# Patient Record
Sex: FEMALE | Race: WHITE | NOT HISPANIC OR LATINO | Employment: OTHER | URBAN - METROPOLITAN AREA
[De-identification: names, ages, dates, MRNs, and addresses within clinical notes are randomized per-mention and may not be internally consistent; named-entity substitution may affect disease eponyms.]

---

## 2023-09-29 ENCOUNTER — OFFICE VISIT (OUTPATIENT)
Dept: URGENT CARE | Facility: CLINIC | Age: 48
End: 2023-09-29
Payer: COMMERCIAL

## 2023-09-29 ENCOUNTER — APPOINTMENT (OUTPATIENT)
Dept: RADIOLOGY | Facility: CLINIC | Age: 48
End: 2023-09-29
Payer: COMMERCIAL

## 2023-09-29 VITALS
SYSTOLIC BLOOD PRESSURE: 119 MMHG | WEIGHT: 179.6 LBS | OXYGEN SATURATION: 95 % | DIASTOLIC BLOOD PRESSURE: 77 MMHG | TEMPERATURE: 99 F | HEART RATE: 92 BPM | RESPIRATION RATE: 22 BRPM

## 2023-09-29 DIAGNOSIS — J06.9 ACUTE URI: Primary | ICD-10-CM

## 2023-09-29 DIAGNOSIS — R05.1 ACUTE COUGH: ICD-10-CM

## 2023-09-29 PROCEDURE — 99214 OFFICE O/P EST MOD 30 MIN: CPT | Performed by: PHYSICIAN ASSISTANT

## 2023-09-29 RX ORDER — AZITHROMYCIN 250 MG/1
TABLET, FILM COATED ORAL
Qty: 6 TABLET | Refills: 0 | Status: SHIPPED | OUTPATIENT
Start: 2023-09-29 | End: 2023-10-03

## 2023-09-29 NOTE — PROGRESS NOTES
[facility]        NAME: Kristin Garcia is a 53 y.o. female  : 1975    MRN: [id number]  DATE: 2023  TIME: 7:19 PM      Assessment and Plan     Acute URI [J06.9]  1. Acute URI  azithromycin (ZITHROMAX) 250 mg tablet    CANCELED: XR chest pa & lateral        Note:   Ordered a CXR, but patient does not have insurance so she refused. Due to the duration of symptoms, will put her on antibiotics anyway that would cover pneumonia. Patient Instructions   There are no Patient Instructions on file for this visit. Follow up with PCP in 3-5 days. Go to ER if symptoms worsen. Chief Complaint     Chief Complaint   Patient presents with   • Cold Like Symptoms     Patient reported that she's been having cold-like symptoms for about week. Patient reported that for two days now she's been having a cough and feeling like she was short of breath. Patient reported she COVID tested herself at home and it was negative. Patient reported she's been using nightquil for symptoms an a cough syrup. History of Present Illness     Patient presents with cough x 2 days. She states she had cold symptoms x 1 week and the cough got worse within the last 2 days. She tried cough syrup and NyQuil without relief. Having dyspnea on exertion and is worried about pneumonia because she had it last year. Review of Systems     Review of Systems   Constitutional: Positive for fatigue. Negative for chills and fever. HENT: Negative for congestion, ear pain, postnasal drip, rhinorrhea, sinus pressure, sinus pain and sore throat. Eyes: Negative for pain and visual disturbance. Respiratory: Positive for cough, chest tightness and shortness of breath. Cardiovascular: Negative for chest pain and palpitations. Gastrointestinal: Negative for abdominal pain, diarrhea, nausea and vomiting. Genitourinary: Negative for dysuria and hematuria.    Musculoskeletal: Negative for arthralgias, back pain and myalgias. Skin: Negative for rash. Neurological: Negative for dizziness, seizures, syncope, numbness and headaches. All other systems reviewed and are negative. Current Medications       Current Outpatient Medications:   •  azithromycin (ZITHROMAX) 250 mg tablet, Take 2 tablets today then 1 tablet daily x 4 days, Disp: 6 tablet, Rfl: 0    Current Allergies     Allergies as of 09/29/2023   • (No Known Allergies)              The following portions of the patient's history were reviewed and updated as appropriate: allergies, current medications, past family history, past medical history, past social history, past surgical history, and problem list.     History reviewed. No pertinent past medical history. History reviewed. No pertinent surgical history. History reviewed. No pertinent family history. Medications have been verified. Objective     /77   Pulse 92   Temp 99 °F (37.2 °C) (Tympanic)   Resp 22   Wt 81.5 kg (179 lb 9.6 oz)   LMP 09/29/2023   SpO2 95%   Patient's last menstrual period was 09/29/2023. Physical Exam     Physical Exam  Vitals and nursing note reviewed. Constitutional:       Appearance: Normal appearance. She is normal weight. She is not ill-appearing. HENT:      Head: Normocephalic and atraumatic. Right Ear: Tympanic membrane, ear canal and external ear normal.      Left Ear: Tympanic membrane, ear canal and external ear normal.      Nose: Nose normal.      Mouth/Throat:      Mouth: Mucous membranes are moist.      Pharynx: Oropharynx is clear. Cardiovascular:      Rate and Rhythm: Normal rate and regular rhythm. Heart sounds: Normal heart sounds. Pulmonary:      Effort: Pulmonary effort is normal.      Breath sounds: Normal breath sounds. Skin:     General: Skin is warm and dry. Neurological:      General: No focal deficit present. Mental Status: She is alert and oriented to person, place, and time.    Psychiatric: Mood and Affect: Mood normal.         Behavior: Behavior normal.

## 2024-12-04 ENCOUNTER — OFFICE VISIT (OUTPATIENT)
Dept: URGENT CARE | Facility: CLINIC | Age: 49
End: 2024-12-04
Payer: COMMERCIAL

## 2024-12-04 VITALS
TEMPERATURE: 97 F | SYSTOLIC BLOOD PRESSURE: 104 MMHG | RESPIRATION RATE: 18 BRPM | WEIGHT: 163 LBS | BODY MASS INDEX: 28.88 KG/M2 | HEIGHT: 63 IN | OXYGEN SATURATION: 97 % | HEART RATE: 84 BPM | DIASTOLIC BLOOD PRESSURE: 68 MMHG

## 2024-12-04 DIAGNOSIS — L50.9 URTICARIA: Primary | ICD-10-CM

## 2024-12-04 PROCEDURE — 99213 OFFICE O/P EST LOW 20 MIN: CPT | Performed by: PHYSICIAN ASSISTANT

## 2024-12-04 RX ORDER — PREDNISONE 20 MG/1
TABLET ORAL
Qty: 24 TABLET | Refills: 0 | Status: SHIPPED | OUTPATIENT
Start: 2024-12-04 | End: 2024-12-16

## 2024-12-04 NOTE — PATIENT INSTRUCTIONS
Recommended oral steroid.  Advised to continue with oral and topical Benadryl as needed.  Advised to no longer use a new soap.        Follow up with PCP in 3-5 days.  Proceed to  ER if symptoms worsen.    If tests are performed, our office will contact you with results only if changes need to made to the care plan discussed with you at the visit. You can review your full results on [facility].

## 2024-12-04 NOTE — PROGRESS NOTES
[facility]        NAME: Bushra Mcgill is a 49 y.o. female  : 1975    MRN: [id number]  DATE: 2024  TIME: 4:21 PM    Assessment and Plan   Urticaria [L50.9]  1. Urticaria  predniSONE 20 mg tablet            Patient Instructions     Patient Instructions   Recommended oral steroid.  Advised to continue with oral and topical Benadryl as needed.  Advised to no longer use a new soap.        Follow up with PCP in 3-5 days.  Proceed to  ER if symptoms worsen.    If tests are performed, our office will contact you with results only if changes need to made to the care plan discussed with you at the visit. You can review your full results on [facility].        Chief Complaint     Chief Complaint   Patient presents with    Rash     Pt states she woke up with itchy rash allover body yesterday. Pt using benadryl cream and orally. Pt unsure what it is from but did use new soap last week with peppermint oil but was fine until yesterday.          History of Present Illness       Rash  This is a new problem. The current episode started yesterday. The problem has been gradually worsening since onset. The rash is diffuse. The problem is moderate. The rash is characterized by swelling, redness and itchiness. She was exposed to a new detergent/soap. Associated symptoms include itching. Past treatments include antihistamine and anti-itch cream. The treatment provided no relief.       Review of Systems   Review of Systems   Skin:  Positive for itching and rash.   All other systems reviewed and are negative.        Current Medications       Current Outpatient Medications:     predniSONE 20 mg tablet, Take 3 tablets (60 mg total) by mouth daily for 4 days, THEN 2 tablets (40 mg total) daily for 4 days, THEN 1 tablet (20 mg total) daily for 4 days., Disp: 24 tablet, Rfl: 0    Current Allergies     Allergies as of 2024    (No Known Allergies)            The following portions of the patient's  "history were reviewed and updated as appropriate: allergies, current medications, past family history, past medical history, past social history, past surgical history and problem list.     History reviewed. No pertinent past medical history.    History reviewed. No pertinent surgical history.    History reviewed. No pertinent family history.      Medications have been verified.        Objective   /68   Pulse 84   Temp (!) 97 °F (36.1 °C)   Resp 18   Ht 5' 3\" (1.6 m)   Wt 73.9 kg (163 lb)   SpO2 97%   BMI 28.87 kg/m²        Physical Exam     Physical Exam  Vitals and nursing note reviewed.   Constitutional:       General: She is not in acute distress.     Appearance: Normal appearance.   Skin:     General: Skin is warm and dry.      Findings: Rash present. Rash is urticarial (Diffuse on the bilateral upper extremities, lower extremities and the abdomen.).   Neurological:      General: No focal deficit present.      Mental Status: She is alert and oriented to person, place, and time.   Psychiatric:         Mood and Affect: Mood normal.         Behavior: Behavior normal.                   "